# Patient Record
Sex: FEMALE | Race: WHITE | NOT HISPANIC OR LATINO | Employment: UNEMPLOYED | URBAN - METROPOLITAN AREA
[De-identification: names, ages, dates, MRNs, and addresses within clinical notes are randomized per-mention and may not be internally consistent; named-entity substitution may affect disease eponyms.]

---

## 2019-05-14 PROBLEM — F12.10 CANNABIS ABUSE: Status: ACTIVE | Noted: 2019-05-14

## 2019-05-14 PROBLEM — O99.210 OBESITY IN PREGNANCY: Status: ACTIVE | Noted: 2019-05-14

## 2019-05-14 PROBLEM — O23.40 UTI IN PREGNANCY: Status: ACTIVE | Noted: 2019-05-14

## 2019-05-14 PROBLEM — O35.07X0 FETAL MICROCEPHALY: Status: ACTIVE | Noted: 2019-04-23

## 2019-05-14 PROBLEM — O09.93 HIGH-RISK PREGNANCY IN THIRD TRIMESTER: Status: ACTIVE | Noted: 2019-05-14

## 2019-05-14 PROBLEM — O09.899 HISTORY OF PRETERM DELIVERY, CURRENTLY PREGNANT: Status: ACTIVE | Noted: 2019-04-23

## 2019-05-14 PROBLEM — Z3A.36 36 WEEKS GESTATION OF PREGNANCY: Status: ACTIVE | Noted: 2019-05-14

## 2019-05-14 PROBLEM — Z98.84 HISTORY OF GASTRIC BYPASS: Status: ACTIVE | Noted: 2019-04-23

## 2019-05-14 PROBLEM — D64.9 ANEMIA: Status: ACTIVE | Noted: 2019-04-23

## 2019-05-14 PROBLEM — O36.1130 ISOIMMUNIZATION FROM BLOOD GROUP INCOMPATIBILITY DURING PREGNANCY IN THIRD TRIMESTER: Status: ACTIVE | Noted: 2019-04-23

## 2019-05-14 PROBLEM — O99.013 ANEMIA DURING PREGNANCY IN THIRD TRIMESTER: Status: ACTIVE | Noted: 2019-04-23

## 2019-05-14 PROBLEM — O09.529 AMA (ADVANCED MATERNAL AGE) MULTIGRAVIDA 35+: Status: ACTIVE | Noted: 2019-05-14

## 2019-05-14 PROBLEM — Z30.9 ENCOUNTER FOR CONTRACEPTIVE MANAGEMENT: Status: ACTIVE | Noted: 2019-05-14

## 2019-05-16 PROBLEM — A49.1 GBS (GROUP B STREPTOCOCCUS) INFECTION: Status: ACTIVE | Noted: 2019-05-16

## 2019-06-13 ENCOUNTER — SOCIAL WORK (OUTPATIENT)
Dept: ADMINISTRATIVE | Facility: OTHER | Age: 34
End: 2019-06-13

## 2019-06-13 NOTE — PROGRESS NOTES
SW  pt's FMLA paperwork from the  box. SW completed the demographic sheet/attached clinical note on pt's FMLA paperwork and placed the paperwork  in md basket for completion. No other needs identified at this time.    Poppy Sorensen MSW  Pager#[phone]

## 2019-06-14 ENCOUNTER — SOCIAL WORK (OUTPATIENT)
Dept: ADMINISTRATIVE | Facility: OTHER | Age: 34
End: 2019-06-14

## 2019-06-14 NOTE — PROGRESS NOTES
SW received pt's completed FMLA Paperwork from MD and fax to([phone]). SW made phone call to pt([phone]) regarding the above. SW scanned paperwork into epic.No other needs identified at this time.    Poppy Sorensen,MSW  Pager#[phone]

## 2019-07-10 PROBLEM — O23.40 UTI IN PREGNANCY: Status: RESOLVED | Noted: 2019-05-14 | Resolved: 2019-07-10

## 2019-07-10 PROBLEM — Z30.9 ENCOUNTER FOR CONTRACEPTIVE MANAGEMENT: Status: RESOLVED | Noted: 2019-05-14 | Resolved: 2019-07-10

## 2019-07-10 PROBLEM — A49.1 GBS (GROUP B STREPTOCOCCUS) INFECTION: Status: RESOLVED | Noted: 2019-05-16 | Resolved: 2019-07-10

## 2019-07-10 PROBLEM — O36.1130 ISOIMMUNIZATION FROM BLOOD GROUP INCOMPATIBILITY DURING PREGNANCY IN THIRD TRIMESTER: Status: RESOLVED | Noted: 2019-04-23 | Resolved: 2019-07-10

## 2019-07-10 PROBLEM — O09.529 AMA (ADVANCED MATERNAL AGE) MULTIGRAVIDA 35+: Status: RESOLVED | Noted: 2019-05-14 | Resolved: 2019-07-10

## 2019-07-10 PROBLEM — O09.899 HISTORY OF PRETERM DELIVERY, CURRENTLY PREGNANT: Status: RESOLVED | Noted: 2019-04-23 | Resolved: 2019-07-10

## 2019-07-10 PROBLEM — O99.013 ANEMIA DURING PREGNANCY IN THIRD TRIMESTER: Status: RESOLVED | Noted: 2019-04-23 | Resolved: 2019-07-10

## 2019-07-10 PROBLEM — Z3A.36 36 WEEKS GESTATION OF PREGNANCY: Status: RESOLVED | Noted: 2019-05-14 | Resolved: 2019-07-10

## 2019-07-10 PROBLEM — O09.93 HIGH-RISK PREGNANCY IN THIRD TRIMESTER: Status: RESOLVED | Noted: 2019-05-14 | Resolved: 2019-07-10

## 2019-07-10 PROBLEM — Z97.5 IUD (INTRAUTERINE DEVICE) IN PLACE: Status: ACTIVE | Noted: 2019-07-10
